# Patient Record
(demographics unavailable — no encounter records)

---

## 2025-01-02 NOTE — HISTORY OF PRESENT ILLNESS
[de-identified] : 46-year-old female presents today with left knee pain x 1 month. No specific injury reported. However, the patient reports she attended an [employer] class and noticed pain after power walking on incline. The pain is constant and worse with flexion. C/O stiffness as well as swelling to the knee. Ibuprofen provides mild relief. She localizes pain to medial aspect of the knee with radiation to the back.    The patient's past medical history, past surgical history, medications and allergies were reviewed by me today with the patient and documented accordingly. In addition, the patient's family and social history, which were noncontributory to this visit were reviewed also.

## 2025-01-02 NOTE — ADDENDUM
[FreeTextEntry1] : This note was written by Madeleine Pina on 01/02/2025 acting solely as a scribe for Dr. Jayy Jones.   All medical record entries made by the Scribe were at my, Dr. Jayy Jones, direction and personally dictated by me on 01/02/2025. I have personally reviewed the chart and agree that the record accurately reflects my personal performance of the history, physical exam, assessment and plan.

## 2025-01-02 NOTE — PHYSICAL EXAM
[de-identified] : Left knee exam  Skin: Clean, dry, intact Inspection: No obvious malalignment, no masses, +swelling, +moderate effusion Pulses: 2+ DP/PT pulses ROM: 0-120 degrees of flexion. + pain with deep knee flexion/extension. Tenderness: + MJLT. No LJLT. No pain over the patella facets. No pain to the quadriceps tendon. No pain to the patella tendon. No posterior knee tenderness. Stability: Stable to varus, valgus. Negative lachman testing. Negative anterior drawer, negative posterior drawer. Strength: 5/5 Q/H/TA/GS/EHL, without atrophy Neuro: In tact to light touch throughout, DTR's normal Additional tests: + McMurrays test, Negative patellar grind test.  [de-identified] : The following radiographs were ordered and read by me during this patients visit. I reviewed each radiograph in detail with the patient and discussed the findings as highlighted below.   4 views of the left knee were obtained, 01/02/2025, that show no acute fracture or dislocation. There is trace medial, no lateral and trace patellofemoral degenerative changes seen. There is no significant malalignment. No significant other obvious osseous abnormality, otherwise unremarkable.

## 2025-01-02 NOTE — DISCUSSION/SUMMARY
[de-identified] : 45 y/o female with left knee pain.  Patient presents for evaluation of left knee pain. The patient's symptoms are consistent with possible meniscal pathology through the medial compartment of the knee with positive provocative meniscal testing as well as joint line tenderness. Patient reports stiffness and swelling. Discussed with the patient in detail the concern for underlying internal derangement to the meniscus and possible treatment options that may include conservative management (e.g. RICE, activity modification, PT, injection therapy) versus operative arthroscopy. Discussed that treatment would likely depend on the nature of the injury, quality of the chondral surfaces (degree of arthrosis) as seen on MRI imaging.   Recommendation: Rest, ice, compression, elevation (RICE) and OTC NSAID's as instructed until MRI imaging.   Follow up after MRI.